# Patient Record
Sex: FEMALE | Race: WHITE | Employment: OTHER | URBAN - METROPOLITAN AREA
[De-identification: names, ages, dates, MRNs, and addresses within clinical notes are randomized per-mention and may not be internally consistent; named-entity substitution may affect disease eponyms.]

---

## 2019-05-20 PROCEDURE — 81003 URINALYSIS AUTO W/O SCOPE: CPT | Performed by: INTERNAL MEDICINE

## 2020-09-14 ENCOUNTER — APPOINTMENT (OUTPATIENT)
Dept: MRI IMAGING | Facility: HOSPITAL | Age: 65
End: 2020-09-14
Attending: EMERGENCY MEDICINE
Payer: MEDICARE

## 2020-09-14 ENCOUNTER — HOSPITAL ENCOUNTER (EMERGENCY)
Facility: HOSPITAL | Age: 65
Discharge: HOME | End: 2020-09-14
Attending: EMERGENCY MEDICINE
Payer: MEDICARE

## 2020-09-14 VITALS
WEIGHT: 160 LBS | TEMPERATURE: 98 F | SYSTOLIC BLOOD PRESSURE: 114 MMHG | BODY MASS INDEX: 29.44 KG/M2 | HEIGHT: 62 IN | HEART RATE: 63 BPM | DIASTOLIC BLOOD PRESSURE: 70 MMHG | OXYGEN SATURATION: 98 % | RESPIRATION RATE: 18 BRPM

## 2020-09-14 DIAGNOSIS — H61.23 BILATERAL IMPACTED CERUMEN: ICD-10-CM

## 2020-09-14 DIAGNOSIS — R42 VERTIGO: Primary | ICD-10-CM

## 2020-09-14 LAB
ANION GAP SERPL CALC-SCNC: 3 MMOL/L (ref 0–18)
BASOPHILS # BLD AUTO: 0.02 X10(3) UL (ref 0–0.2)
BASOPHILS NFR BLD AUTO: 0.2 %
BUN BLD-MCNC: 17 MG/DL (ref 7–18)
BUN/CREAT SERPL: 23.6 (ref 10–20)
CALCIUM BLD-MCNC: 9.3 MG/DL (ref 8.5–10.1)
CHLORIDE SERPL-SCNC: 103 MMOL/L (ref 98–112)
CO2 SERPL-SCNC: 31 MMOL/L (ref 21–32)
CREAT BLD-MCNC: 0.72 MG/DL (ref 0.55–1.02)
DEPRECATED RDW RBC AUTO: 42.7 FL (ref 35.1–46.3)
EOSINOPHIL # BLD AUTO: 0.12 X10(3) UL (ref 0–0.7)
EOSINOPHIL NFR BLD AUTO: 1.3 %
ERYTHROCYTE [DISTWIDTH] IN BLOOD BY AUTOMATED COUNT: 13 % (ref 11–15)
GLUCOSE BLD-MCNC: 130 MG/DL (ref 70–99)
HCT VFR BLD AUTO: 38.6 % (ref 35–48)
HGB BLD-MCNC: 13 G/DL (ref 12–16)
IMM GRANULOCYTES # BLD AUTO: 0.03 X10(3) UL (ref 0–1)
IMM GRANULOCYTES NFR BLD: 0.3 %
LYMPHOCYTES # BLD AUTO: 1.45 X10(3) UL (ref 1–4)
LYMPHOCYTES NFR BLD AUTO: 15.3 %
MCH RBC QN AUTO: 30 PG (ref 26–34)
MCHC RBC AUTO-ENTMCNC: 33.7 G/DL (ref 31–37)
MCV RBC AUTO: 88.9 FL (ref 80–100)
MONOCYTES # BLD AUTO: 0.64 X10(3) UL (ref 0.1–1)
MONOCYTES NFR BLD AUTO: 6.7 %
NEUTROPHILS # BLD AUTO: 7.23 X10 (3) UL (ref 1.5–7.7)
NEUTROPHILS # BLD AUTO: 7.23 X10(3) UL (ref 1.5–7.7)
NEUTROPHILS NFR BLD AUTO: 76.2 %
OSMOLALITY SERPL CALC.SUM OF ELEC: 287 MOSM/KG (ref 275–295)
PLATELET # BLD AUTO: 176 10(3)UL (ref 150–450)
POTASSIUM SERPL-SCNC: 4 MMOL/L (ref 3.5–5.1)
RBC # BLD AUTO: 4.34 X10(6)UL (ref 3.8–5.3)
SODIUM SERPL-SCNC: 137 MMOL/L (ref 136–145)
WBC # BLD AUTO: 9.5 X10(3) UL (ref 4–11)

## 2020-09-14 PROCEDURE — 69210 REMOVE IMPACTED EAR WAX UNI: CPT

## 2020-09-14 PROCEDURE — 70551 MRI BRAIN STEM W/O DYE: CPT | Performed by: EMERGENCY MEDICINE

## 2020-09-14 PROCEDURE — 85025 COMPLETE CBC W/AUTO DIFF WBC: CPT | Performed by: EMERGENCY MEDICINE

## 2020-09-14 PROCEDURE — 80048 BASIC METABOLIC PNL TOTAL CA: CPT | Performed by: EMERGENCY MEDICINE

## 2020-09-14 PROCEDURE — 96375 TX/PRO/DX INJ NEW DRUG ADDON: CPT

## 2020-09-14 PROCEDURE — 93005 ELECTROCARDIOGRAM TRACING: CPT

## 2020-09-14 PROCEDURE — 96374 THER/PROPH/DIAG INJ IV PUSH: CPT

## 2020-09-14 PROCEDURE — 93010 ELECTROCARDIOGRAM REPORT: CPT | Performed by: EMERGENCY MEDICINE

## 2020-09-14 PROCEDURE — 96361 HYDRATE IV INFUSION ADD-ON: CPT

## 2020-09-14 PROCEDURE — 99285 EMERGENCY DEPT VISIT HI MDM: CPT

## 2020-09-14 RX ORDER — MIDAZOLAM HYDROCHLORIDE 5 MG/ML
2 INJECTION INTRAMUSCULAR; INTRAVENOUS ONCE AS NEEDED
Status: COMPLETED | OUTPATIENT
Start: 2020-09-14 | End: 2020-09-14

## 2020-09-14 RX ORDER — ONDANSETRON 2 MG/ML
4 INJECTION INTRAMUSCULAR; INTRAVENOUS ONCE
Status: COMPLETED | OUTPATIENT
Start: 2020-09-14 | End: 2020-09-14

## 2020-09-14 RX ORDER — MECLIZINE HYDROCHLORIDE 25 MG/1
25 TABLET ORAL 3 TIMES DAILY PRN
Qty: 30 TABLET | Refills: 0 | Status: SHIPPED | OUTPATIENT
Start: 2020-09-14 | End: 2020-12-16 | Stop reason: ALTCHOICE

## 2020-09-14 RX ORDER — MECLIZINE HYDROCHLORIDE 25 MG/1
25 TABLET ORAL ONCE
Status: COMPLETED | OUTPATIENT
Start: 2020-09-14 | End: 2020-09-14

## 2020-09-14 RX ORDER — ONDANSETRON 4 MG/1
4 TABLET, ORALLY DISINTEGRATING ORAL EVERY 4 HOURS PRN
Qty: 10 TABLET | Refills: 0 | Status: SHIPPED | OUTPATIENT
Start: 2020-09-14 | End: 2020-09-21 | Stop reason: ALTCHOICE

## 2020-09-15 NOTE — ED NOTES
Patient discharged home in no acute distress in care of spouse. A&Ox4, skin p/w/d, denies cp/sob. Ambulating with steady gait and verbalized understanding of d/c instructions and prescriptions.

## 2020-09-15 NOTE — ED PROVIDER NOTES
Patient Seen in: [facility] AND [facility] Emergency Department      History   Patient presents with:  Dizziness    Stated Complaint:     HPI    64-year-old female presents for complaint of dizziness with associated nausea and vomiting.   Patient reports that sym 2\")   Wt 72.6 kg   SpO2 98%   BMI 29.26 kg/m²         Physical Exam  Vitals signs and nursing note reviewed. Constitutional:       General: She is not in acute distress. Appearance: Normal appearance. She is well-developed.    HENT:      Head: Normoc Coordination normal.      Gait: Gait is intact.    Psychiatric:         Attention and Perception: Attention normal.         Mood and Affect: Mood normal.         Speech: Speech normal.         Behavior: Behavior normal.               ED Course     Labs Revi was able to clear the left and remove some on the right. Debrox and ENT recommended. Vertigo instructions and precautions provided. Return precautions given.     Imaging:   Mri Brain Wo Acute (3) Sequence (cpt=70551)    Result Date: 9/14/2020  PROCEDURE: Clinical impression as well as any lab results and radiology findings were discussed with the patient. Patient is advised to follow up with PCP for reevaluation. Return precautions were given.  Patient voices understanding and agreement with the whitney

## 2020-09-15 NOTE — ED NOTES
Assumed care of Wayne Spicer upon arrival in room 34 via triage. Patient A&Ox3, see triage note and nursing assessment. Patient without any recent head injury or LOC. No headache. No slurred speech or facial droop. No lateralizing weakness. No vision changes.  D

## 2020-09-16 ENCOUNTER — OFFICE VISIT (OUTPATIENT)
Dept: OTOLARYNGOLOGY | Facility: CLINIC | Age: 65
End: 2020-09-16
Payer: MEDICARE

## 2020-09-16 ENCOUNTER — OFFICE VISIT (OUTPATIENT)
Dept: AUDIOLOGY | Facility: CLINIC | Age: 65
End: 2020-09-16
Payer: MEDICARE

## 2020-09-16 VITALS
BODY MASS INDEX: 29.44 KG/M2 | SYSTOLIC BLOOD PRESSURE: 118 MMHG | DIASTOLIC BLOOD PRESSURE: 76 MMHG | TEMPERATURE: 98 F | HEIGHT: 62 IN | WEIGHT: 160 LBS

## 2020-09-16 DIAGNOSIS — H61.21 IMPACTED CERUMEN OF RIGHT EAR: ICD-10-CM

## 2020-09-16 DIAGNOSIS — R42 DIZZINESS: Primary | ICD-10-CM

## 2020-09-16 PROCEDURE — 92557 COMPREHENSIVE HEARING TEST: CPT | Performed by: AUDIOLOGIST

## 2020-09-16 PROCEDURE — 99204 OFFICE O/P NEW MOD 45 MIN: CPT | Performed by: OTOLARYNGOLOGY

## 2020-09-16 PROCEDURE — 92567 TYMPANOMETRY: CPT | Performed by: AUDIOLOGIST

## 2020-09-16 PROCEDURE — G0268 REMOVAL OF IMPACTED WAX MD: HCPCS | Performed by: OTOLARYNGOLOGY

## 2020-09-16 NOTE — PROGRESS NOTES
Boris Wolf is a 77year old female. Patient presents with:  Dizziness: c/o dizziness started on 9/14      HISTORY OF PRESENT ILLNESS  9/16/2020  The patient presents for evaluation of dizziness.   Days ago she was going about her daily activities we education: Not on file      Highest education level: Not on file    Tobacco Use      Smoking status: Never Smoker      Smokeless tobacco: Never Used    Substance and Sexual Activity      Alcohol use: Never        Frequency: Never      Drug use: Never features - Normal. Eyebrows - Normal. Skull - Normal.  Loud clicking of the TMJ joints bilaterally             Ears Normal Inspection - Right: Normal, Left: Normal. Canal - Right: canal abrasion Left: Normal. TM - Right: Normal, Left: Normal.   audiogram w Use as directed for 6 days, Disp: 21 tablet, Rfl: 0  ASSESSMENT AND PLAN    1. Dizziness  Resolving and I suspect that this is just acute vestibular neuronitis most likely from a viral inner ear infection.   I did discuss the fact that this is a diagnosis o

## 2020-09-16 NOTE — PROGRESS NOTES
AUDIOGRAM     Alejo Martinez was referred for testing by Angelika Modi for dizziness  1/14/1955  [id number]          Otoscopic inspection: right ear no cerumen; left ear no cerumen.        Tests/Procedures  Patient was tested via  standard insert earphone

## 2020-09-21 PROBLEM — Z00.00 ROUTINE GENERAL MEDICAL EXAMINATION AT A HEALTH CARE FACILITY: Status: ACTIVE | Noted: 2020-09-21

## 2020-09-21 PROBLEM — Z78.0 POSTMENOPAUSAL: Status: ACTIVE | Noted: 2020-09-21

## 2020-09-21 PROBLEM — G89.29 CHRONIC MIDLINE LOW BACK PAIN WITHOUT SCIATICA: Status: ACTIVE | Noted: 2020-09-21

## 2020-09-21 PROBLEM — R42 VERTIGO: Status: ACTIVE | Noted: 2020-09-21

## 2020-09-21 PROBLEM — M54.50 CHRONIC MIDLINE LOW BACK PAIN WITHOUT SCIATICA: Status: ACTIVE | Noted: 2020-09-21

## 2020-09-21 PROBLEM — R53.83 FATIGUE, UNSPECIFIED TYPE: Status: ACTIVE | Noted: 2020-09-21

## 2020-10-31 ENCOUNTER — APPOINTMENT (OUTPATIENT)
Dept: LAB | Facility: HOSPITAL | Age: 65
End: 2020-10-31
Attending: INTERNAL MEDICINE
Payer: MEDICARE

## 2020-10-31 DIAGNOSIS — Z11.59 ENCOUNTER FOR SCREENING FOR OTHER VIRAL DISEASES: ICD-10-CM

## 2020-10-31 DIAGNOSIS — Z01.818 PREOP EXAMINATION: ICD-10-CM

## 2020-11-03 ENCOUNTER — HOSPITAL ENCOUNTER (OUTPATIENT)
Dept: GENERAL RADIOLOGY | Facility: HOSPITAL | Age: 65
Discharge: HOME | End: 2020-11-03
Attending: INTERNAL MEDICINE
Payer: MEDICARE

## 2020-11-03 DIAGNOSIS — K56.699 STRICTURE OF SIGMOID COLON (HCC): ICD-10-CM

## 2020-11-03 PROCEDURE — 74270 X-RAY XM COLON 1CNTRST STD: CPT | Performed by: INTERNAL MEDICINE

## 2020-12-13 ENCOUNTER — APPOINTMENT (OUTPATIENT)
Dept: CT IMAGING | Facility: HOSPITAL | Age: 65
End: 2020-12-13
Attending: EMERGENCY MEDICINE
Payer: MEDICARE

## 2020-12-13 ENCOUNTER — HOSPITAL ENCOUNTER (OUTPATIENT)
Age: 65
Discharge: ANOTHER HOSPITAL | End: 2020-12-13
Payer: MEDICARE

## 2020-12-13 ENCOUNTER — HOSPITAL ENCOUNTER (EMERGENCY)
Facility: HOSPITAL | Age: 65
Discharge: HOME | End: 2020-12-13
Attending: EMERGENCY MEDICINE
Payer: MEDICARE

## 2020-12-13 VITALS
DIASTOLIC BLOOD PRESSURE: 74 MMHG | HEART RATE: 74 BPM | BODY MASS INDEX: 29.26 KG/M2 | RESPIRATION RATE: 20 BRPM | OXYGEN SATURATION: 97 % | SYSTOLIC BLOOD PRESSURE: 140 MMHG | TEMPERATURE: 98 F | WEIGHT: 159 LBS | HEIGHT: 62 IN

## 2020-12-13 VITALS
RESPIRATION RATE: 18 BRPM | OXYGEN SATURATION: 97 % | TEMPERATURE: 99 F | HEART RATE: 104 BPM | SYSTOLIC BLOOD PRESSURE: 135 MMHG | DIASTOLIC BLOOD PRESSURE: 76 MMHG

## 2020-12-13 DIAGNOSIS — K57.92 ACUTE DIVERTICULITIS: Primary | ICD-10-CM

## 2020-12-13 DIAGNOSIS — R10.9 ABDOMINAL PAIN, ACUTE: Primary | ICD-10-CM

## 2020-12-13 PROCEDURE — 85025 COMPLETE CBC W/AUTO DIFF WBC: CPT | Performed by: EMERGENCY MEDICINE

## 2020-12-13 PROCEDURE — 74177 CT ABD & PELVIS W/CONTRAST: CPT | Performed by: EMERGENCY MEDICINE

## 2020-12-13 PROCEDURE — 80048 BASIC METABOLIC PNL TOTAL CA: CPT | Performed by: EMERGENCY MEDICINE

## 2020-12-13 PROCEDURE — 96360 HYDRATION IV INFUSION INIT: CPT

## 2020-12-13 PROCEDURE — 99284 EMERGENCY DEPT VISIT MOD MDM: CPT

## 2020-12-13 PROCEDURE — 99204 OFFICE O/P NEW MOD 45 MIN: CPT | Performed by: NURSE PRACTITIONER

## 2020-12-13 PROCEDURE — 83690 ASSAY OF LIPASE: CPT | Performed by: EMERGENCY MEDICINE

## 2020-12-13 PROCEDURE — 80076 HEPATIC FUNCTION PANEL: CPT | Performed by: EMERGENCY MEDICINE

## 2020-12-13 RX ORDER — AMOXICILLIN AND CLAVULANATE POTASSIUM 875; 125 MG/1; MG/1
875 TABLET, FILM COATED ORAL ONCE
Status: COMPLETED | OUTPATIENT
Start: 2020-12-13 | End: 2020-12-13

## 2020-12-13 RX ORDER — AMOXICILLIN AND CLAVULANATE POTASSIUM 875; 125 MG/1; MG/1
1 TABLET, FILM COATED ORAL 2 TIMES DAILY
Qty: 20 TABLET | Refills: 0 | Status: SHIPPED | OUTPATIENT
Start: 2020-12-13 | End: 2020-12-23

## 2020-12-13 RX ORDER — FLUCONAZOLE 150 MG/1
150 TABLET ORAL ONCE
Status: COMPLETED | OUTPATIENT
Start: 2020-12-13 | End: 2020-12-13

## 2020-12-13 RX ORDER — DICYCLOMINE HCL 20 MG
20 TABLET ORAL 4 TIMES DAILY PRN
Qty: 40 TABLET | Refills: 0 | Status: SHIPPED | OUTPATIENT
Start: 2020-12-13 | End: 2020-12-23

## 2020-12-13 NOTE — ED PROVIDER NOTES
Patient Seen in: [facility]      History   Patient presents with:  Abdomen/Flank Pain    Stated Complaint: abd pain    HPI    Patient presents to the immediate care with complaint of diffuse lower abdominal pain that has worsened in the last except as noted above.     Physical Exam     ED Triage Vitals [12/13/20 1523]   /76   Pulse 104   Resp 18   Temp 99.4 °F (37.4 °C)   Temp src Temporal   SpO2 97 %   O2 Device None (Room air)       Current:/76   Pulse 104   Temp 99.4 °F (37.4 °C) will drive herself to the emergency room for further work-up at this time. Patient agrees with this plan of care. Patient left the immediate care in stable condition. Vital signs were stable. There is no acute distress. Patient was alert and oriented.

## 2020-12-13 NOTE — ED INITIAL ASSESSMENT (HPI)
C/o abd pain and nausea but no vomiting  Gave herself enema because pt thought that she was constipated

## 2020-12-13 NOTE — ED PROVIDER NOTES
Patient Seen in: [facility] AND [facility] Emergency Department    History   No chief complaint on file.     Stated Complaint: abdominal pain      HPI    59-year-old female with past medical history of GERD, bronchitis, dyslipidemia presenting for evaluation of two dx age 52   • Diabetes Sister    • Lipids Sister    • Obesity Sister    • Heart Disorder Brother    • Lipids Brother        Social History    Tobacco Use      Smoking status: Never Smoker      Smokeless tobacco: Never Used    Alcohol use: Never      Lucilla Klinefelter - Abnormal; Notable for the following components:    WBC 12.3 (*)     Neutrophil Absolute Prelim 9.45 (*)     Neutrophil Absolute 9.45 (*)     Monocyte Absolute 1.15 (*)     All other components within normal limits   LIPASE - Normal   CBC WITH DIFFERENTIA GI/MESENTERY:  There is no evidence of bowel obstruction.   Focal sigmoid colonic wall thickening with surrounding inflammatory stranding that appears centered along diverticular disease; several sigmoid diverticula appear impacted with hyperdense presumed diagnosis includes but is not limited to colitis, diverticulitis, anemia, electrode derangement, IBD, neoplasm, IBS.     Pulse ox: 96%:Normal on room air, as interpreted by myself    Evaluation for several days of left lower quadrant abdominal gassiness wit

## 2020-12-13 NOTE — ED INITIAL ASSESSMENT (HPI)
Abdominal pain since October. Had new meds, AB and colonoscopy since. Here today because she ate 7 pieces of pizza a few days ago and has had intermittent bloating and pain since.

## 2020-12-16 PROBLEM — K57.92 ACUTE DIVERTICULITIS: Status: ACTIVE | Noted: 2020-12-16

## 2020-12-16 PROBLEM — Z09 HOSPITAL DISCHARGE FOLLOW-UP: Status: ACTIVE | Noted: 2020-12-16

## 2021-01-04 RX ORDER — SODIUM, POTASSIUM,MAG SULFATES 17.5-3.13G
SOLUTION, RECONSTITUTED, ORAL ORAL
Qty: 1 BOTTLE | Refills: 0 | Status: CANCELLED | OUTPATIENT
Start: 2021-01-04

## 2021-01-04 NOTE — H&P
[address] [facility] [address] Gastroenterology                                                                                                  Clinic History and Physical     Pa removed. After her most recent colonoscopy she describes an x-ray with barium to more fully evaluate the colon and was told with the exception of diverticulosis this was negative.   Told to return in 5 years for colonoscopy recall however she is very n GM/177ML Oral Solution SPLIT DOSETAKE FIRST DOSE AROUND 5 6P. M NIGHT BEFORE.  SECOND DOSE 6 HOURS BEFORE COMING TO PROCEDURE         Allergies:    Codeine                     ROS:   CONSTITUTIONAL:  negative for fevers, rigors  EYES:  negative for diplopia presents for ER follow-up        1. History of diverticulitis/hx colon polyps: Physical exam unremarkable. The patient successfully completed antibiotics after diagnosis of diverticulitis. She previously followed with gastroenterology at [facility].

## 2021-01-05 ENCOUNTER — TELEPHONE (OUTPATIENT)
Dept: GASTROENTEROLOGY | Facility: CLINIC | Age: 66
End: 2021-01-05

## 2021-01-05 ENCOUNTER — OFFICE VISIT (OUTPATIENT)
Dept: GASTROENTEROLOGY | Facility: CLINIC | Age: 66
End: 2021-01-05
Payer: MEDICARE

## 2021-01-05 VITALS
BODY MASS INDEX: 29.44 KG/M2 | HEIGHT: 62 IN | TEMPERATURE: 98 F | SYSTOLIC BLOOD PRESSURE: 127 MMHG | WEIGHT: 160 LBS | HEART RATE: 69 BPM | DIASTOLIC BLOOD PRESSURE: 78 MMHG

## 2021-01-05 DIAGNOSIS — Z87.19 HISTORY OF COLONIC DIVERTICULITIS: Primary | ICD-10-CM

## 2021-01-05 DIAGNOSIS — Z86.010 HISTORY OF COLON POLYPS: ICD-10-CM

## 2021-01-05 PROCEDURE — 99203 OFFICE O/P NEW LOW 30 MIN: CPT | Performed by: NURSE PRACTITIONER

## 2021-01-05 RX ORDER — MULTIVIT-MIN/IRON/FOLIC ACID/K 18-600-40
CAPSULE ORAL
COMMUNITY

## 2021-01-05 RX ORDER — SODIUM, POTASSIUM,MAG SULFATES 17.5-3.13G
SOLUTION, RECONSTITUTED, ORAL ORAL
COMMUNITY
Start: 2020-10-22 | End: 2021-02-01 | Stop reason: ALTCHOICE

## 2021-01-05 NOTE — TELEPHONE ENCOUNTER
I faxed ROSALINA to [facility]/Dr Kailyn Baires for CLN/EGD and some barium study records done in Oct2020 per patient   Fax [phone]

## 2021-01-05 NOTE — TELEPHONE ENCOUNTER
I contacted [facility] and spoke to [facility], she stated they did receive the ROSALINA, I faxed over.    They will be faxing over Vipul Hidalgo 10/2020 and X-ray study 11/2020 records to us in about 10 minutes

## 2021-01-05 NOTE — TELEPHONE ENCOUNTER
We received Colonoscopy records done 12/08/2014 and 10/26/2020 and X-ray of colon done 11/03/2020.   I placed on Katherin's desk for review

## 2021-01-06 NOTE — TELEPHONE ENCOUNTER
Outside records received from Dr. Keven Dutta [facility] gastroenterology) as below      1. November 2020 single contrast x-ray: Related to incomplete colonoscopy, findings: Uncomplicated sigmoid diverticulosis, normal terminal ileum/appendix    2.   October 202

## 2021-02-01 PROBLEM — M76.60 ACHILLES TENDON PAIN: Status: ACTIVE | Noted: 2021-02-01

## 2021-02-01 PROBLEM — E78.00 HYPERCHOLESTEROLEMIA: Status: ACTIVE | Noted: 2021-02-01

## 2022-04-30 ENCOUNTER — HOSPITAL ENCOUNTER (EMERGENCY)
Facility: HOSPITAL | Age: 67
Discharge: HOME | End: 2022-05-01
Payer: MEDICARE

## 2022-04-30 ENCOUNTER — APPOINTMENT (OUTPATIENT)
Dept: GENERAL RADIOLOGY | Facility: HOSPITAL | Age: 67
End: 2022-04-30
Attending: NURSE PRACTITIONER
Payer: MEDICARE

## 2022-04-30 DIAGNOSIS — J40 BRONCHITIS: Primary | ICD-10-CM

## 2022-04-30 LAB
FLUAV + FLUBV RNA SPEC NAA+PROBE: NEGATIVE
FLUAV + FLUBV RNA SPEC NAA+PROBE: NEGATIVE
RSV RNA SPEC NAA+PROBE: NEGATIVE
SARS-COV-2 RNA RESP QL NAA+PROBE: NOT DETECTED

## 2022-04-30 PROCEDURE — 0241U SARS-COV-2/FLU A AND B/RSV BY PCR (GENEXPERT): CPT | Performed by: NURSE PRACTITIONER

## 2022-04-30 PROCEDURE — 71045 X-RAY EXAM CHEST 1 VIEW: CPT | Performed by: NURSE PRACTITIONER

## 2022-04-30 PROCEDURE — 99284 EMERGENCY DEPT VISIT MOD MDM: CPT

## 2022-04-30 PROCEDURE — 94640 AIRWAY INHALATION TREATMENT: CPT

## 2022-04-30 RX ORDER — IPRATROPIUM BROMIDE AND ALBUTEROL SULFATE 2.5; .5 MG/3ML; MG/3ML
3 SOLUTION RESPIRATORY (INHALATION) ONCE
Status: COMPLETED | OUTPATIENT
Start: 2022-04-30 | End: 2022-04-30

## 2022-05-01 VITALS
HEART RATE: 84 BPM | OXYGEN SATURATION: 97 % | DIASTOLIC BLOOD PRESSURE: 75 MMHG | RESPIRATION RATE: 22 BRPM | TEMPERATURE: 99 F | SYSTOLIC BLOOD PRESSURE: 144 MMHG

## 2022-05-01 LAB — S PYO AG THROAT QL: NEGATIVE

## 2022-05-01 PROCEDURE — 87880 STREP A ASSAY W/OPTIC: CPT

## 2022-05-01 RX ORDER — PREDNISONE 20 MG/1
20 TABLET ORAL DAILY
Qty: 3 TABLET | Refills: 0 | Status: SHIPPED | OUTPATIENT
Start: 2022-05-01 | End: 2022-05-04

## 2022-05-01 RX ORDER — ALBUTEROL SULFATE 90 UG/1
2 AEROSOL, METERED RESPIRATORY (INHALATION) EVERY 4 HOURS PRN
Qty: 1 EACH | Refills: 0 | Status: SHIPPED | OUTPATIENT
Start: 2022-05-01 | End: 2022-05-31

## 2024-11-05 ENCOUNTER — HOSPITAL ENCOUNTER (OUTPATIENT)
Dept: MAMMOGRAPHY | Facility: HOSPITAL | Age: 69
Discharge: HOME | End: 2024-11-05
Attending: INTERNAL MEDICINE
Payer: MEDICARE

## 2024-11-05 DIAGNOSIS — R92.30 DENSE BREASTS: ICD-10-CM

## 2024-11-05 DIAGNOSIS — Z12.31 ENCOUNTER FOR SCREENING MAMMOGRAM FOR MALIGNANT NEOPLASM OF BREAST: ICD-10-CM

## 2024-11-05 PROCEDURE — 77067 SCR MAMMO BI INCL CAD: CPT | Performed by: INTERNAL MEDICINE

## 2024-11-05 PROCEDURE — 77063 BREAST TOMOSYNTHESIS BI: CPT | Performed by: INTERNAL MEDICINE

## 2025-04-29 ENCOUNTER — OFFICE VISIT (OUTPATIENT)
Facility: CLINIC | Age: 70
End: 2025-04-29
Payer: MEDICARE

## 2025-04-29 VITALS
WEIGHT: 164.63 LBS | DIASTOLIC BLOOD PRESSURE: 85 MMHG | HEIGHT: 62 IN | SYSTOLIC BLOOD PRESSURE: 149 MMHG | BODY MASS INDEX: 30.29 KG/M2 | HEART RATE: 71 BPM

## 2025-04-29 DIAGNOSIS — K57.32 SIGMOID DIVERTICULITIS: Primary | ICD-10-CM

## 2025-04-29 DIAGNOSIS — Z12.11 COLON CANCER SCREENING: ICD-10-CM

## 2025-04-29 PROCEDURE — 99204 OFFICE O/P NEW MOD 45 MIN: CPT

## 2025-04-29 NOTE — H&P
[facility] - Gastroenterology                                                                                                               Reason for consult: diverticulitis     Requesting physician or provider: Gilson Hanley MD    Chief Complaint   Patient presents with    Consult     For New pt ,Diverticulitis       HPI:   Katelynn St is a 70 year old year-old female with active diagnoses including diverticulosis, high cholesterol. Prior medical/surgical history hysterectomy and other hx in note table.    she is here today for evaluation  #diverticulitis  -reports recent episode of LLQ abd pain, diagnosed with acute proximal sigmoid diverticulitis on CT 4/18/25, done w/ [facility].  She was treated with Augmentin, had side effect of diarrhea but otherwise tolerated the treatment.  Prior to this her last diverticulitis flare was in January, she was treated with antibiotics at that time as well.  She cannot recall her most recent flare before January, but believes she was treated with antibiotics about 5 times total for diverticulitis.  She does try to avoid seeds, nuts, popcorn in general,.  But has linked to her most recent flares to times where she had these foods  -Most recent colonoscopy was in 2020.  It was incomplete due to stricture in the sigmoid colon. She then completed XR colon single contrast Nov 2020. Finding of diverticulosis, otherwise normal     Patient denies symptoms of nausea, vomiting, dyspepsia, dysphagia, odynophagia, globus sensation, heartburn, hematemesis, constipation, hematochezia, or melena. she denies recent change in appetite, fever or unintentional weight loss.      Last colonoscopy:  2020  -incomplete     3 total colonoscopies: polyps on first colonoscopy, 2nd no polyps, 3rd, procedure incomplete 2/2 sigmoid stricture     Last EGD: none     NSAIDS: none   Tobacco: none   Alcohol: none    Marijuana: none   Illicit drugs: none     FH GI malignancy: niece - colon cancer   FH IBD: none     No history of adverse reaction to sedation  No RUMA  No anticoagulants/antiplatelet  No pacemaker/defibrillator    Wt Readings from Last 6 Encounters:   04/29/25 164 lb 9.6 oz (74.7 kg)   03/22/22 165 lb (74.8 kg)   03/09/22 165 lb 12.8 oz (75.2 kg)   09/03/21 160 lb (72.6 kg)   02/01/21 158 lb (71.7 kg)   01/05/21 160 lb (72.6 kg)        History, Medications, Allergies, ROS:      Past Medical History[1]   Past Surgical History[2]   Family Hx: Family History[3]   Social History: Short Social Hx on File[4]     Medications (Active prior to today's visit):  Current Medications[5]    Allergies:  Allergies[6]    ROS:   CONSTITUTIONAL: negative for fevers, chills, sweats  EYES Negative for scleral icterus or redness, and diplopia  HEENT: Negative for hoarseness  RESPIRATORY: Negative for cough and severe shortness of breath  CARDIOVASCULAR: Negative for crushing sub-sternal chest pain  GASTROINTESTINAL: See HPI  GENITOURINARY: Negative for dysuria  MUSCULOSKELETAL: Negative for arthralgias and myalgias  SKIN: Negative for jaundice, rash or pruritus  NEUROLOGICAL: Negative for dizziness and headaches  BEHAVIOR/PSYCH: Negative for psychotic behavior    PHYSICAL EXAM:   Blood pressure 149/85, pulse 71, height 5' 2\" (1.575 m), weight 164 lb 9.6 oz (74.7 kg).    GEN: Alert, no acute distress, well-nourished   HEENT: anicteric sclera, neck supple, trachea midline, MMM, no palpable or tender neck or supraclavicular lymph nodes  CV: RRR, the extremities are warm and well perfused   LUNGS: No increased work of breathing, CTAB  ABDOMEN: Soft, symmetrical, non-tender without distention or guarding. No lesions. Aorta is without bruit or visible pulsation. Umbilicus is midline without herniation. Normoactive bowel sounds are present, No masses, hepatomegaly or splenomegaly noted.  MSK: No erythema, no warmth, no swelling of  joints  SKIN: No jaundice, no erythema, no rashes, no lesions  HEMATOLOGIC: No bleeding, no bruising  NEURO: Alert and interactive, WEBSTER  PSYCH: appropriate mood & affect    Labs/Imaging/Procedures:     Patient's pertinent labs and imaging were reviewed and discussed with patient today.        .  ASSESSMENT/PLAN:   Katelynn St is a 70 year old year-old female with active diagnoses including diverticulosis, high cholesterol. Prior medical/surgical history hysterectomy and other hx in note table.    she is here today for evaluation  #diverticulitis  -reports recent episode of LLQ abd pain, diagnosed with acute proximal sigmoid diverticulitis on CT 4/18/25, done w/ [facility].  She was treated with Augmentin, had side effect of diarrhea but otherwise tolerated the treatment.  Prior to this her last diverticulitis flare was in January, she was treated with antibiotics at that time as well.  She cannot recall her most recent flare before January, but believes she was treated with antibiotics about 5 times total for diverticulitis.  She does try to avoid seeds, nuts, popcorn in general,.  But has linked to her most recent flares to times where she had these foods  -Most recent colonoscopy was in 2020.  It was incomplete due to stricture in the sigmoid colon. She then completed XR colon single contrast Nov 2020. Finding of diverticulosis, otherwise normal     - Recommend daily fiber supplement and avoidance of triggering foods.  She should call if the abdominal pain returns.  ER precautions discussed.  We discussed options for surgery referral given recurrent diverticulitis.  She deferred this at this time.  She is concerned about surgical adhesions from prior hysterectomy and is concerned that surgery may worsen her issue.  We discussed the recommendation for colonoscopy after diverticulitis if not done in the prior year.  She is worried about increased risk for perforation given sigmoid stricture and incomplete  colonoscopy in 2020.  This is reasonable and valid.  We discussed other options including CT colonoscopy which would have to be done externally. Could consider stool test for CRC screening but insufficient for evaluation of diverticulosis and if positive would recommend colonoscopy. She deferred any of these tests at this time but she will consider and we can discuss again at follow up.     Recommendations:    -can take psyllium husk fiber supplement daily such as metamucil     -call me if abdominal pain returns    -emergency room is intolerable pain, fever or bloody diarrhea    -consider options for colorectal cancer screening:  Colonoscopy **recommend after diverticulitis episodes   Cologuard stool test (if positive would need colonoscopy)  Referral out to [facility] or [facility] for CT colonography    -let me know if you would like surgery referral to discuss diverticulosis with a surgeon    -if you take antibiotics you can take a probiotic daily and continue for 2-4 weeks after the antibiotic    -follow up in 3 months      Orders This Visit:  No orders of the defined types were placed in this encounter.      Meds This Visit:  Requested Prescriptions      No prescriptions requested or ordered in this encounter       Imaging & Referrals:  None      CHARLENE Nolasco    [facility] Gastroenterology  4/29/2025        This note was partially prepared using Dragon Medical voice recognition dictation software. As a result, errors may occur. When identified, these errors have been corrected. While every attempt is made to correct errors during dictation, discrepancies may still exist.        [1]   Past Medical History:   Bronchitis    Esophageal reflux    Hyperlipidemia   [2]   Past Surgical History:  Procedure Laterality Date    Colonoscopy  10/2020    [facility]    Hysterectomy      Other surgical history  Hysterectomy 1998   [3]   Family History  Problem Relation Age of Onset    Cancer Mother          breast cancer    Breast Cancer Mother 55        dx age 55    Cancer Father         lung cancer    Diabetes Sister     Lipids Sister     Obesity Sister     Heart Disorder Brother     Lipids Brother     Colon Cancer Niece    [4]   Social History  Socioeconomic History    Marital status: Single   Tobacco Use    Smoking status: Never    Smokeless tobacco: Never   Vaping Use    Vaping status: Never Used   Substance and Sexual Activity    Alcohol use: Never    Drug use: Never   [5]   Current Outpatient Medications   Medication Sig Dispense Refill    cetirizine 10 MG Oral Tab Take 1 tablet (10 mg total) by mouth daily. 90 tablet 3    atorvastatin 10 MG Oral Tab Take 1 tablet (10 mg total) by mouth daily. 90 tablet 1    Calcium Carbonate-Vitamin D 250-125 MG-UNIT Oral Tab Take 1 tablet by mouth daily.      Cholecalciferol (VITAMIN D) 50 MCG (2000 UT) Oral Cap Take by mouth.     [6]   Allergies  Allergen Reactions    Codeine

## 2025-04-29 NOTE — PATIENT INSTRUCTIONS
-can take psyllium husk fiber supplement daily such as metamucil     -call me if abdominal pain returns    -emergency room is intolerable pain, fever or bloody diarrhea    -consider options for colorectal cancer screening:  Colonoscopy **recommend after diverticulitis episodes   Cologuard stool test (if positive would need colonoscopy)  Referral out to [facility] or [facility] for CT colonography    -let me know if you would like surgery referral to discuss diverticulosis with a surgeon    -if you take antibiotics you can take a probiotic daily and continue for 2-4 weeks after the antibiotic    -follow up in 3 months

## (undated) NOTE — LETTER
Jerry Otoole Md  [address], [address]       09/16/20        Patient: Harika Marte   YOB: 1955   Date of Visit: 9/16/2020       Dear  Dr. Stuart Blanca MD,      Thank you for referring Pool Archuleta